# Patient Record
Sex: MALE | Race: OTHER | NOT HISPANIC OR LATINO | URBAN - METROPOLITAN AREA
[De-identification: names, ages, dates, MRNs, and addresses within clinical notes are randomized per-mention and may not be internally consistent; named-entity substitution may affect disease eponyms.]

---

## 2017-06-06 VITALS
OXYGEN SATURATION: 100 % | HEART RATE: 52 BPM | WEIGHT: 175.93 LBS | SYSTOLIC BLOOD PRESSURE: 208 MMHG | TEMPERATURE: 96 F | RESPIRATION RATE: 16 BRPM | HEIGHT: 69 IN | DIASTOLIC BLOOD PRESSURE: 118 MMHG

## 2017-06-06 RX ORDER — VANCOMYCIN HCL 1 G
1000 VIAL (EA) INTRAVENOUS ONCE
Qty: 0 | Refills: 0 | Status: DISCONTINUED | OUTPATIENT
Start: 2017-06-07 | End: 2017-06-07

## 2017-06-06 NOTE — ASU PATIENT PROFILE, ADULT - PMH
Complication of implanted penile prosthesis    Erosion of penile prosthesis    Fall October 2016  HTN (hypertension)    Parkinson disease    Syncope Acid reflux    Complication of implanted penile prosthesis    Erosion of penile prosthesis    Fall October 2016  HTN (hypertension)    Parkinson disease    Syncope

## 2017-06-07 ENCOUNTER — OUTPATIENT (OUTPATIENT)
Dept: OUTPATIENT SERVICES | Facility: HOSPITAL | Age: 67
LOS: 1 days | Discharge: HOME | End: 2017-06-07
Payer: MEDICARE

## 2017-06-07 VITALS
RESPIRATION RATE: 16 BRPM | TEMPERATURE: 99 F | HEART RATE: 73 BPM | OXYGEN SATURATION: 97 % | DIASTOLIC BLOOD PRESSURE: 76 MMHG | SYSTOLIC BLOOD PRESSURE: 119 MMHG

## 2017-06-07 DIAGNOSIS — Z90.89 ACQUIRED ABSENCE OF OTHER ORGANS: Chronic | ICD-10-CM

## 2017-06-07 DIAGNOSIS — Z41.9 ENCOUNTER FOR PROCEDURE FOR PURPOSES OTHER THAN REMEDYING HEALTH STATE, UNSPECIFIED: Chronic | ICD-10-CM

## 2017-06-07 PROCEDURE — 54416 REMV/REPL PENIS CONTAIN PROS: CPT

## 2017-06-07 PROCEDURE — C1813: CPT

## 2017-06-07 PROCEDURE — 88300 SURGICAL PATH GROSS: CPT

## 2017-06-07 PROCEDURE — 54235 NJX CORPORA CAVERNOSA RX AGT: CPT

## 2017-06-07 PROCEDURE — 54360 PENIS PLASTIC SURGERY: CPT

## 2017-06-07 RX ORDER — AMANTADINE HCL 100 MG
100 CAPSULE ORAL ONCE
Qty: 0 | Refills: 0 | Status: COMPLETED | OUTPATIENT
Start: 2017-06-07 | End: 2017-06-07

## 2017-06-07 RX ORDER — CARBIDOPA AND LEVODOPA 25; 100 MG/1; MG/1
1 TABLET ORAL ONCE
Qty: 0 | Refills: 0 | Status: COMPLETED | OUTPATIENT
Start: 2017-06-07 | End: 2017-06-07

## 2017-06-07 RX ORDER — OXYCODONE HYDROCHLORIDE 5 MG/1
5 TABLET ORAL ONCE
Qty: 0 | Refills: 0 | Status: DISCONTINUED | OUTPATIENT
Start: 2017-06-07 | End: 2017-06-07

## 2017-06-07 RX ORDER — SODIUM CHLORIDE 9 MG/ML
1000 INJECTION, SOLUTION INTRAVENOUS
Qty: 0 | Refills: 0 | Status: DISCONTINUED | OUTPATIENT
Start: 2017-06-07 | End: 2017-06-07

## 2017-06-07 RX ADMIN — Medication 100 MILLIGRAM(S): at 12:36

## 2017-06-07 RX ADMIN — CARBIDOPA AND LEVODOPA 1 TABLET(S): 25; 100 TABLET ORAL at 12:37

## 2017-06-07 NOTE — BRIEF OPERATIVE NOTE - OPERATION/FINDINGS
malleable penile prosthesis removed and replaced with inflatable coloplast 20cm. Left vee perforated and prosthesis sutured into place

## 2017-06-07 NOTE — BRIEF OPERATIVE NOTE - PRE-OP DX
Malfunction of genitourinary device or graft  06/07/2017    Active  Adalberto Gautam  Organic erectile dysfunction  06/07/2017    Active  Adalberto Gautam

## 2017-06-12 DIAGNOSIS — N52.8 OTHER MALE ERECTILE DYSFUNCTION: ICD-10-CM

## 2017-06-12 DIAGNOSIS — K21.9 GASTRO-ESOPHAGEAL REFLUX DISEASE WITHOUT ESOPHAGITIS: ICD-10-CM

## 2017-06-12 DIAGNOSIS — I10 ESSENTIAL (PRIMARY) HYPERTENSION: ICD-10-CM

## 2017-06-12 DIAGNOSIS — N48.6 INDURATION PENIS PLASTICA: ICD-10-CM
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
## 2017-06-12 DIAGNOSIS — T83.490A OTHER MECHANICAL COMPLICATION OF IMPLANTED PENILE PROSTHESIS, INITIAL ENCOUNTER: ICD-10-CM

## 2017-06-12 DIAGNOSIS — Z79.82 LONG TERM (CURRENT) USE OF ASPIRIN: ICD-10-CM

## 2017-06-12 DIAGNOSIS — G20 PARKINSON'S DISEASE: ICD-10-CM

## 2017-06-12 LAB — SURGICAL PATHOLOGY STUDY: SIGNIFICANT CHANGE UP

## 2017-10-31 ENCOUNTER — HOSPITAL ENCOUNTER (EMERGENCY)
Dept: HOSPITAL 45 - C.EDB | Age: 67
Discharge: HOME | End: 2017-10-31
Payer: COMMERCIAL

## 2017-10-31 VITALS
BODY MASS INDEX: 26.12 KG/M2 | HEIGHT: 69.02 IN | WEIGHT: 176.37 LBS | WEIGHT: 176.37 LBS | HEIGHT: 69.02 IN | BODY MASS INDEX: 26.12 KG/M2

## 2017-10-31 VITALS — SYSTOLIC BLOOD PRESSURE: 121 MMHG | DIASTOLIC BLOOD PRESSURE: 78 MMHG

## 2017-10-31 VITALS — HEART RATE: 66 BPM | OXYGEN SATURATION: 99 %

## 2017-10-31 VITALS — TEMPERATURE: 98.24 F

## 2017-10-31 DIAGNOSIS — Z87.440: ICD-10-CM

## 2017-10-31 DIAGNOSIS — Z79.899: ICD-10-CM
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
## 2017-10-31 DIAGNOSIS — G20: ICD-10-CM

## 2017-10-31 DIAGNOSIS — Z91.81: ICD-10-CM

## 2017-10-31 DIAGNOSIS — Z98.890: ICD-10-CM

## 2017-10-31 DIAGNOSIS — S09.90XA: ICD-10-CM

## 2017-10-31 DIAGNOSIS — Z79.82: ICD-10-CM

## 2017-10-31 DIAGNOSIS — R00.1: ICD-10-CM

## 2017-10-31 DIAGNOSIS — I10: ICD-10-CM

## 2017-10-31 DIAGNOSIS — R09.89: Primary | ICD-10-CM

## 2017-10-31 DIAGNOSIS — Z87.828: ICD-10-CM

## 2017-10-31 LAB
ALBUMIN/GLOB SERPL: 1.2 {RATIO} (ref 0.9–2)
ALP SERPL-CCNC: 68 U/L (ref 45–117)
ALT SERPL-CCNC: 9 U/L (ref 12–78)
ANION GAP SERPL CALC-SCNC: 7 MMOL/L (ref 3–11)
APPEARANCE UR: CLEAR
AST SERPL-CCNC: 14 U/L (ref 15–37)
BASOPHILS # BLD: 0.03 K/UL (ref 0–0.2)
BASOPHILS NFR BLD: 0.6 %
BILIRUB UR-MCNC: (no result) MG/DL
BUN SERPL-MCNC: 33 MG/DL (ref 7–18)
BUN/CREAT SERPL: 23.9 (ref 10–20)
CALCIUM SERPL-MCNC: 9.5 MG/DL (ref 8.5–10.1)
CHLORIDE SERPL-SCNC: 106 MMOL/L (ref 98–107)
CO2 SERPL-SCNC: 26 MMOL/L (ref 21–32)
COLOR UR: (no result)
COMPLETE: YES
CREAT CL PREDICTED SERPL C-G-VRATE: 50.9 ML/MIN
CREAT SERPL-MCNC: 1.37 MG/DL (ref 0.6–1.4)
EOSINOPHIL NFR BLD AUTO: 152 K/UL (ref 130–400)
GLOBULIN SER-MCNC: 3.8 GM/DL (ref 2.5–4)
GLUCOSE SERPL-MCNC: 89 MG/DL (ref 70–99)
HCT VFR BLD CALC: 39.2 % (ref 42–52)
IG%: 0 %
IMM GRANULOCYTES NFR BLD AUTO: 24.8 %
LYMPHOCYTES # BLD: 1.19 K/UL (ref 1.2–3.4)
MAGNESIUM SERPL-MCNC: 2.2 MG/DL (ref 1.8–2.4)
MANUAL MICROSCOPIC REQUIRED?: NO
MCH RBC QN AUTO: 29.1 PG (ref 25–34)
MCHC RBC AUTO-ENTMCNC: 33.2 G/DL (ref 32–36)
MCV RBC AUTO: 87.9 FL (ref 80–100)
MONOCYTES NFR BLD: 7.1 %
NEUTROPHILS # BLD AUTO: 4.8 %
NEUTROPHILS NFR BLD AUTO: 62.7 %
NITRITE UR QL STRIP: (no result)
PH UR STRIP: 5.5 [PH] (ref 4.5–7.5)
PMV BLD AUTO: 9.1 FL (ref 7.4–10.4)
POTASSIUM SERPL-SCNC: 4.1 MMOL/L (ref 3.5–5.1)
RBC # BLD AUTO: 4.46 M/UL (ref 4.7–6.1)
REVIEW REQ?: NO
SODIUM SERPL-SCNC: 139 MMOL/L (ref 136–145)
SP GR UR STRIP: 1.03 (ref 1–1.03)
TSH SERPL-ACNC: 0.91 UIU/ML (ref 0.3–4.5)
URINE BILL WITH OR WITHOUT MIC: (no result)
URINE EPITHELIAL CELL AUTO: (no result) /LPF (ref 0–5)
UROBILINOGEN UR-MCNC: (no result) MG/DL
WBC # BLD AUTO: 4.79 K/UL (ref 4.8–10.8)
ZZUR CULT IF INDIC CLEAN CATCH: NO

## 2017-10-31 NOTE — EMERGENCY ROOM VISIT NOTE
History


Report prepared by Sobia:  Juan Diego Thao


Under the Supervision of:  Dr. Bobo Burrows M.D.


First contact with patient:  14:53


Chief Complaint:  CHOKING


Stated Complaint:  FOOD STUCK IN THROAT-FELL BACKWARDS





History of Present Illness


The patient is a 67 year old male who presents to the Emergency Room with 

complaints of persistent choking starting around 50 minutes prior to arrival 

after eating a pastry. The patient's wife states that the patient ate a pastry 

and was then having some difficulty swallowing water afterwards. She states 

that she then performed the Heimlich maneuver twice, and both times some pieces 

of apple and pastry would come up. She states that afterwards the patient fell 

backwards, and hit his head, and then while on the ground he was hitting his 

head on the floor multiple times. The wife states that the patient has a 

history of Parkinson's and seizures, and the patient does not remember the 

episode though he did not lose consciousness. The wife additionally states that 

the patient was having difficulty walking earlier before this episode, and he 

seems to be a little weak. She additionally notes that the patient has had some 

cold symptoms recently, and he was coughing, sneezing, and he was sweating a 

little bit earlier. The patient states that he is currently up to date with his 

tetanus shot.





   Source of History:  patient, spouse/significant other


   Onset:  50 minutes prior to arrival


   Position:  other (global)


   Quality:  other (choking)


   Timing:  other (persistent)


   Associated Symptoms:  + cough, No LOC





Review of Systems


See HPI for pertinent positives & negatives. A total of 10 systems reviewed and 

were otherwise negative.





Past Medical & Surgical


Medical Problems:


(1) Bacteremia


(2) Closed head injury


(3) Encounter for removal of sutures


(4) Erectile dysfunction


(5) Facial laceration


(6) Facial laceration


(7) Facial laceration


(8) Fall


(9) Forehead laceration


(10) HTN (hypertension)


(11) Hypertension


(12) Parkinson's disease


(13) Recurrent UTI (urinary tract infection)


(14) Sepsis


(15) Thumb laceration


Surgical Problems:


(1) History of penile implant








Family History





FHx: cancer


FHx: diabetes


FHx: hypertension





Social History


Smoking Status:  Never Smoker


Alcohol Use:  none


Marital Status:  


Housing Status:  lives with significant other


Occupation Status:  retired





Current/Historical Medications


Scheduled


Amantadine Hcl (Symmetrel), 100 MG PO TID


Aspirin (Aspirin Ec), 81 MG PO HS


Carbidopa/Levodopa (Sinemet Cr 50MG/200MG), 1 TAB PO TID


Clonazepam (Klonopin), 0.5 MG PO UD


Gabapentin (Gabapentin), 400 MG PO DAILY


Metoprolol Tartrate (Lopressor), 25 MG PO BID


Midodrine Hcl (Midodrine Hcl), 5 MG PO TID


Ramipril (Ramipril), 2.5 MG PO DAILY


Rasagiline Mesylate (Azilect), 1 MG PO NOON


Rivastigmine (Exelon), 1 PATCH TOP DAILY


Rotigotine (Neupro), 8 MG TOP UD





Scheduled PRN


Mirabegron (Myrbetriq Er), 50 MG PO DAILY PRN for





Allergies


Coded Allergies:  


     No Known Allergies (Unverified , 6/20/16)





Physical Exam


Vital Signs











  Date Time  Temp Pulse Resp B/P (MAP) Pulse Ox O2 Delivery O2 Flow Rate FiO2


 


10/31/17 18:31    121/78    


 


10/31/17 18:16  66 18 125/60 99 Room Air  


 


10/31/17 17:35  62  207/125    


 


10/31/17 17:11  61  167/113    


 


10/31/17 16:57  61  219/131 97 Room Air  


 


10/31/17 16:37  57      


 


10/31/17 16:34     97 Room Air  


 


10/31/17 16:33  57  19/113 97 Room Air  


 


10/31/17 14:45 36.8 85 18 184/115 91 Room Air  











Physical Exam


GENERAL: Patient is in no acute distress.


HEENT: No facial trauma. Mucous membranes are moist. There is an abrasion to 

the right posterior scalp. 


NECK: No posterior cervical spine tenderness. No stridor, no adenopathy, no 

meningismus, trachea is midline.


LUNGS: Clear to auscultation bilaterally, no wheeze, no rhonchi, breath sounds 

equal.


HEART: Without murmurs gallops or rubs, regular rate and rhythm.


ABDOMEN: Soft, nontender, bowel sounds positive, no hernias, no peritonitis.


EXTREMITIES: No cyanosis or edema, full range of motion of all the joints 

without pain or difficulty, no signs for acute trauma.


NEUROLOGIC: Awake, alert, moving all extremities equally. Confusion noted 

consistent with Parkinson's/dementia. GCS of 14.


SKIN: No rash, no jaundice, no diaphoresis.





Medical Decision & Procedures


ER Provider


Diagnostic Interpretation:


Radiology results as stated below per my review and radiologist interpretation:











CT HEAD WITHOUT CONTRAST (CT)





CLINICAL HISTORY: Head trauma. Right occipital laceration.    





COMPARISON STUDY:  6/20/2016





TECHNIQUE:  Axial CT of the brain is performed from the vertex to the skull


base. IV contrast was not administered for this examination. A dose lowering


technique was utilized adhering to the principles of ALARA.


 





CT DOSE: 638.56 mGycm





FINDINGS:





No intra or extra-axial mass lesions are visualized. There is no CT evidence of


acute cortical infarction. There is no evidence of midline shift. There is no


acute  hemorrhage. No calvarial fractures are visualized. 


There are patchy white matter hypodensities likely on a small vessel basis.


There is no evidence of pathologic ventricular dilatation.


There is no evidence of acute sinusitis





IMPRESSION: No acute intracranial findings





Electronically signed by:  Artis Wilhelm M.D.


10/31/2017 3:40 PM





Dictated Date/Time:  10/31/2017 3:39 PM




















CHEST ONE VIEW PORTABLE





CLINICAL HISTORY: Choking episode. Leg maneuver was performed.    





COMPARISON STUDY:  11/25/2015





FINDINGS: The heart is mildly enlarged. There is aortic tortuosity/ectasia. No


pneumothorax is visualized. There is no pneumomediastinum. There is no focal


pulmonary consolidation. There are no pleural effusions. An electronic device


projects over the left chest, possibly representing an event recorder.[ 





IMPRESSION: No active disease in the chest.





Electronically signed by:  Artis Wilhelm M.D.


10/31/2017 3:59 PM





Dictated Date/Time:  10/31/2017 3:58 PM





Laboratory Results


10/31/17 15:15








Red Blood Count 4.46, Mean Corpuscular Volume 87.9, Mean Corpuscular Hemoglobin 

29.1, Mean Corpuscular Hemoglobin Concent 33.2, Mean Platelet Volume 9.1, 

Neutrophils (%) (Auto) 62.7, Lymphocytes (%) (Auto) 24.8, Monocytes (%) (Auto) 

7.1, Eosinophils (%) (Auto) 4.8, Basophils (%) (Auto) 0.6, Neutrophils # (Auto) 

3.00, Lymphocytes # (Auto) 1.19, Monocytes # (Auto) 0.34, Eosinophils # (Auto) 

0.23, Basophils # (Auto) 0.03





10/31/17 15:15

















Test


  10/31/17


15:15 10/31/17


17:25


 


White Blood Count


  4.79 K/uL


(4.8-10.8) 


 


 


Red Blood Count


  4.46 M/uL


(4.7-6.1) 


 


 


Hemoglobin


  13.0 g/dL


(14.0-18.0) 


 


 


Hematocrit 39.2 % (42-52)  


 


Mean Corpuscular Volume


  87.9 fL


() 


 


 


Mean Corpuscular Hemoglobin


  29.1 pg


(25-34) 


 


 


Mean Corpuscular Hemoglobin


Concent 33.2 g/dl


(32-36) 


 


 


Platelet Count


  152 K/uL


(130-400) 


 


 


Mean Platelet Volume


  9.1 fL


(7.4-10.4) 


 


 


Neutrophils (%) (Auto) 62.7 %  


 


Lymphocytes (%) (Auto) 24.8 %  


 


Monocytes (%) (Auto) 7.1 %  


 


Eosinophils (%) (Auto) 4.8 %  


 


Basophils (%) (Auto) 0.6 %  


 


Neutrophils # (Auto)


  3.00 K/uL


(1.4-6.5) 


 


 


Lymphocytes # (Auto)


  1.19 K/uL


(1.2-3.4) 


 


 


Monocytes # (Auto)


  0.34 K/uL


(0.11-0.59) 


 


 


Eosinophils # (Auto)


  0.23 K/uL


(0-0.5) 


 


 


Basophils # (Auto)


  0.03 K/uL


(0-0.2) 


 


 


RDW Standard Deviation


  44.9 fL


(36.4-46.3) 


 


 


RDW Coefficient of Variation


  13.9 %


(11.5-14.5) 


 


 


Immature Granulocyte % (Auto) 0.0 %  


 


Immature Granulocyte # (Auto)


  0.00 K/uL


(0.00-0.02) 


 


 


Anion Gap


  7.0 mmol/L


(3-11) 


 


 


Est Creatinine Clear Calc


Drug Dose 50.9 ml/min 


  


 


 


Estimated GFR (


American) 60.6 


  


 


 


Estimated GFR (Non-


American 52.3 


  


 


 


BUN/Creatinine Ratio 23.9 (10-20)  


 


Calcium Level


  9.5 mg/dl


(8.5-10.1) 


 


 


Magnesium Level


  2.2 mg/dl


(1.8-2.4) 


 


 


Total Bilirubin


  0.4 mg/dl


(0.2-1) 


 


 


Aspartate Amino Transf


(AST/SGOT) 14 U/L (15-37) 


  


 


 


Alanine Aminotransferase


(ALT/SGPT) 9 U/L (12-78) 


  


 


 


Alkaline Phosphatase


  68 U/L


() 


 


 


Troponin I


  < 0.015 ng/ml


(0-0.045) 


 


 


Total Protein


  8.3 gm/dl


(6.4-8.2) 


 


 


Albumin


  4.5 gm/dl


(3.4-5.0) 


 


 


Globulin


  3.8 gm/dl


(2.5-4.0) 


 


 


Albumin/Globulin Ratio 1.2 (0.9-2)  


 


Thyroid Stimulating Hormone


(TSH) 0.912 uIu/ml


(0.300-4.500) 


 


 


Urine Color  DK YELLOW 


 


Urine Appearance  CLEAR (CLEAR) 


 


Urine pH  5.5 (4.5-7.5) 


 


Urine Specific Gravity


  


  1.029


(1.000-1.030)


 


Urine Protein  NEG (NEG) 


 


Urine Glucose (UA)  NEG (NEG) 


 


Urine Ketones  TRACE (NEG) 


 


Urine Occult Blood  NEG (NEG) 


 


Urine Nitrite  NEG (NEG) 


 


Urine Bilirubin  NEG (NEG) 


 


Urine Urobilinogen  NEG (NEG) 


 


Urine Leukocyte Esterase  TRACE (NEG) 


 


Urine WBC (Auto)


  


  5-10 /hpf


(0-5)


 


Urine RBC (Auto)  0-4 /hpf (0-4) 


 


Urine Hyaline Casts (Auto)  0 /lpf (0-5) 


 


Urine Epithelial Cells (Auto)


  


  20-30 /lpf


(0-5)


 


Urine Bacteria (Auto)  NEG (NEG) 








Laboratory results reviewed by me.





Medications Administered











 Medications


  (Trade)  Dose


 Ordered  Sig/[place]


 Route  Start Time


 Stop Time Status Last Admin


Dose Admin


 


 Sodium Chloride  500 ml @ 


 999 mls/hr  Q31M STAT


 IV  10/31/17 16:05


 10/31/17 16:35 DC 10/31/17 16:32


999 MLS/HR


 


 Hydralazine HCl


  (HydrALAZINE INJ)  10 mg  NOW  STAT


 IV  10/31/17 16:46


 10/31/17 16:49 DC 10/31/17 16:56


10 MG


 


 Hydralazine HCl


  (HydrALAZINE INJ)  10 mg  NOW  STAT


 IV  10/31/17 17:46


 10/31/17 17:47 DC 10/31/17 17:51


10 MG











ECG


Indication:  other (choking episode)


Rate (beats per minute):  55


Rhythm:  sinus bradycardia


Findings:  T-wave inversion (Inferior), no ectopy, other (LVH)


Comparison ECG Date:  6/20/16


Change:  no significant change





ED Course


1453: The patient was evaluated in room A4. A complete history and physical 

exam was performed.





1605: Sodium Chloride 500 ml @ 999 mls/hr IV





1644: I reevaluated the patient, and he was doing fine. He notes that he did 

not take his blood pressure medication this morning.





1646: Hydralazine HCl 10mg IV





1746: Hydralazine HCl 10mg IV





1814: Reevaluated the patient, and he is doing well. Discussed results and 

discharge instructions: He verbalized understanding and agreement. The patient 

is ready for discharge.





Medical Decision


Differential Diagnoses include: intracranial bleeding, skull fracture, 

dehydration, electrolyte imbalance, anemia, infection, choking episode, and 

aspiration.





There is no leukocytosis or concerning anemia.  No significant electrolyte 

abnormality, kidney failure or hepatitis.  The patient appears to be in a 

euthyroid state.  EKG shows a sinus rhythm, no acute ischemia.  Cardiac enzyme 

testing times one does not suggest acute cardiac injury.  Chest x-ray does not 

show pneumonia or pneumothorax.  Brain CT shows no acute bleed or mass effect.  

Urinalysis does not show evidence for infection.  On exam, there were no focal 

neurologic deficits.





The patient received IV saline, he is resting comfortably.  The patient had not 

taken his blood pressure medication today.  He received IV hydralazine, he 

received 2 doses.  His blood pressure is now nicely controlled.  He states he 

does use hydralazine as needed at home for high blood pressure.





The patient's workup is unrevealing.  He is doing well, no respiratory distress

, no wheezing.  No evidence for concerning injury from the fall.  The patient 

was reassured.  He is being discharged.





Head Trauma


GCS Score:  14





Medication Reconcilliation


Current Medication List:  was personally reviewed by me





Blood Pressure Screening


Patient's blood pressure:  Elevated blood pressure


Blood pressure disposition:  Referred to PCP





Impression





 Primary Impression:  


 Choking episode


 Additional Impressions:  


 Fall


 Head trauma


 Hypertension





Scribe Attestation


The scribe's documentation has been prepared under my direction and personally 

reviewed by me in its entirety. I confirm that the note above accurately 

reflects all work, treatment, procedures, and medical decision making performed 

by me.





Departure Information


Dispostion


Home / Self-Care





Referrals


No Doctor, Assigned (PCP)





Forms


HOME CARE DOCUMENTATION FORM,                                                 

               IMPORTANT VISIT INFORMATION





Patient Instructions


[facility] [employer] [facility]





Additional Instructions





see santiago sotelo this week for a recheck and blood pressure check





be sure to keep a watch of your blood pressure and take your meds as prescribed





return if worsening





lab testing and imaging today was all ok





Problem Qualifiers

## 2017-10-31 NOTE — DIAGNOSTIC IMAGING REPORT
CT HEAD WITHOUT CONTRAST (CT)



CLINICAL HISTORY: Head trauma. Right occipital laceration.    



COMPARISON STUDY:  6/20/2016



TECHNIQUE:  Axial CT of the brain is performed from the vertex to the skull

base. IV contrast was not administered for this examination. A dose lowering

technique was utilized adhering to the principles of ALARA.

 



CT DOSE: 638.56 mGycm



FINDINGS:



No intra or extra-axial mass lesions are visualized. There is no CT evidence of

acute cortical infarction. There is no evidence of midline shift. There is no

acute  hemorrhage. No calvarial fractures are visualized. 

There are patchy white matter hypodensities likely on a small vessel basis.

There is no evidence of pathologic ventricular dilatation.

There is no evidence of acute sinusitis



IMPRESSION: No acute intracranial findings







Electronically signed by:  Artis Wilhelm M.D.

10/31/2017 3:40 PM



Dictated Date/Time:  10/31/2017 3:39 PM

## 2017-10-31 NOTE — DIAGNOSTIC IMAGING REPORT
CHEST ONE VIEW PORTABLE



CLINICAL HISTORY: Choking episode. Leg maneuver was performed.    



COMPARISON STUDY:  11/25/2015



FINDINGS: The heart is mildly enlarged. There is aortic tortuosity/ectasia. No

pneumothorax is visualized. There is no pneumomediastinum. There is no focal

pulmonary consolidation. There are no pleural effusions. An electronic device

projects over the left chest, possibly representing an event recorder.[ 



IMPRESSION: No active disease in the chest.







Electronically signed by:  Artis Wilhelm M.D.

10/31/2017 3:59 PM



Dictated Date/Time:  10/31/2017 3:58 PM

## 2018-02-19 ENCOUNTER — HOSPITAL ENCOUNTER (OUTPATIENT)
Dept: HOSPITAL 45 - C.LAB1850 | Age: 68
Discharge: HOME | End: 2018-02-19
Attending: INTERNAL MEDICINE
Payer: COMMERCIAL

## 2018-02-19 DIAGNOSIS — I10: ICD-10-CM

## 2018-02-19 DIAGNOSIS — G20: Primary | ICD-10-CM

## 2018-02-19 DIAGNOSIS — G90.9: ICD-10-CM

## 2018-02-19 LAB
ALBUMIN SERPL-MCNC: 3.9 GM/DL (ref 3.4–5)
ALP SERPL-CCNC: 81 U/L (ref 45–117)
ALT SERPL-CCNC: 9 U/L (ref 12–78)
AST SERPL-CCNC: 11 U/L (ref 15–37)
BASOPHILS # BLD: 0.04 K/UL (ref 0–0.2)
BASOPHILS NFR BLD: 0.7 %
BUN SERPL-MCNC: 25 MG/DL (ref 7–18)
CALCIUM SERPL-MCNC: 9.3 MG/DL (ref 8.5–10.1)
CO2 SERPL-SCNC: 27 MMOL/L (ref 21–32)
CREAT SERPL-MCNC: 1.37 MG/DL (ref 0.6–1.4)
EOS ABS #: 0.15 K/UL (ref 0–0.5)
EOSINOPHIL NFR BLD AUTO: 189 K/UL (ref 130–400)
GLUCOSE SERPL-MCNC: 81 MG/DL (ref 70–99)
HCT VFR BLD CALC: 41.7 % (ref 42–52)
HGB BLD-MCNC: 13.9 G/DL (ref 14–18)
IG#: 0.01 K/UL (ref 0–0.02)
IMM GRANULOCYTES NFR BLD AUTO: 20.8 %
KETONES UR QL STRIP: 82 MG/DL
LYMPHOCYTES # BLD: 1.19 K/UL (ref 1.2–3.4)
MCH RBC QN AUTO: 29.5 PG (ref 25–34)
MCHC RBC AUTO-ENTMCNC: 33.3 G/DL (ref 32–36)
MCV RBC AUTO: 88.5 FL (ref 80–100)
MONO ABS #: 0.39 K/UL (ref 0.11–0.59)
MONOCYTES NFR BLD: 6.8 %
NEUT ABS #: 3.94 K/UL (ref 1.4–6.5)
NEUTROPHILS # BLD AUTO: 2.6 %
NEUTROPHILS NFR BLD AUTO: 68.9 %
PH UR: 163 MG/DL (ref 0–200)
PMV BLD AUTO: 9.4 FL (ref 7.4–10.4)
POTASSIUM SERPL-SCNC: 4.2 MMOL/L (ref 3.5–5.1)
PROT SERPL-MCNC: 7.8 GM/DL (ref 6.4–8.2)
RED CELL DISTRIBUTION WIDTH CV: 13.6 % (ref 11.5–14.5)
RED CELL DISTRIBUTION WIDTH SD: 44.2 FL (ref 36.4–46.3)
SODIUM SERPL-SCNC: 138 MMOL/L (ref 136–145)
WBC # BLD AUTO: 5.72 K/UL (ref 4.8–10.8)

## 2018-05-08 ENCOUNTER — HOSPITAL ENCOUNTER (OUTPATIENT)
Dept: HOSPITAL 45 - C.LAB1850 | Age: 68
Discharge: HOME | End: 2018-05-08
Attending: INTERNAL MEDICINE
Payer: COMMERCIAL

## 2018-05-08 DIAGNOSIS — R39.9: Primary | ICD-10-CM

## 2018-08-02 ENCOUNTER — HOSPITAL ENCOUNTER (OUTPATIENT)
Dept: HOSPITAL 45 - C.RAD1850 | Age: 68
Discharge: HOME | End: 2018-08-02
Attending: INTERNAL MEDICINE
Payer: COMMERCIAL

## 2018-08-02 DIAGNOSIS — T14.8XXA: Primary | ICD-10-CM
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
## 2018-08-02 NOTE — DIAGNOSTIC IMAGING REPORT
FACIAL BONES MIN 3 VIEWS RTN



CLINICAL HISTORY: Fall. Facial bone pain.   



COMPARISON STUDY:  None.



FINDINGS: The calvarium, mandible, nasal bones, orbits, and zygomatic arches

appear intact. No facial fractures identified. Paranasal sinuses and mastoid air

cells are clear. The nasal septum is midline.



IMPRESSION:  No fractures identified within the facial bones by conventional

radiographic technique. Of note, if the patient's pain continues to progress

consider follow-up dedicated maxillofacial CT. 









Electronically signed by:  Epifanio Waters M.D.

8/2/2018 2:54 PM



Dictated Date/Time:  8/2/2018 2:52 PM

## 2023-03-08 NOTE — PACU DISCHARGE NOTE - COMMENTS
discharge instruction reviewed in full  with pt and wife good understanding, Fernandez removal reviewed with pt and wife good understanding verbalized, copy provided, syringe provided, pt ready for d/c home condition stable.
<--- Click to Launch ICDx for PreOp, PostOp and Procedure

## 2023-03-15 NOTE — ASU PREOP CHECKLIST - SELECT TESTS ORDERED
INR/EKG/CBC/PT/PTT/BMP/Urinalysis DISPLAY PLAN FREE TEXT DISPLAY PLAN FREE TEXT DISPLAY PLAN FREE TEXT DISPLAY PLAN FREE TEXT